# Patient Record
Sex: FEMALE | Race: BLACK OR AFRICAN AMERICAN | URBAN - NONMETROPOLITAN AREA
[De-identification: names, ages, dates, MRNs, and addresses within clinical notes are randomized per-mention and may not be internally consistent; named-entity substitution may affect disease eponyms.]

---

## 2017-08-04 PROBLEM — D57.3: Noted: 2017-08-04

## 2017-08-04 PROBLEM — H52.03: Noted: 2017-08-04

## 2018-10-23 NOTE — PROCEDURE NOTE: SURGICAL
<p>Prior to commencing surgery patient identification, surgical procedure, site, and side were confirmed by Dr. Soo Martines. Following topical proparacaine anesthesia, the patient was positioned at the YAG laser, a contact lens coupled to the cornea with methylcellulose and an axial posterior capsulotomy performed without complication using 3.4 Mj x 40. Excess methylcellulose was washed from the eye, one drop of Alphagan was instilled and the patient returned to the holding area having tolerated the procedure well and without complication. </p><p>MRN [id number]</p>

## 2019-05-09 NOTE — PROCEDURE NOTE: CLINICAL
PROCEDURE NOTE: Epilation #4 Left Upper Lid. Diagnosis: Blepharitis, Unspecified. Anesthesia: Topical. Prior to treatment, the risks/benefits/alternatives were discussed. The patient wished to proceed with procedure. Aberrant lashes removed from * lid(s) using microforcep. Patient tolerated procedure well. There were no complications. Post-op instructions given. Zoey Mota

## 2019-07-15 NOTE — PROCEDURE NOTE: SURGICAL
<p>Prior to commencing surgery patient identification, surgical procedure, site, and side were confirmed by Dr. Danilo Solorio. Following topical proparacaine anesthesia, the patient was positioned at the YAG laser, a contact lens coupled to the cornea with methylcellulose and an axial posterior capsulotomy performed without complication using 3.4 Mj x 31. Excess methylcellulose was washed from the eye, one drop of Alphagan was instilled and the patient returned to the holding area having tolerated the procedure well and without complication. </p><p>MRN [id number]</p> Universal Safety Interventions

## 2019-08-28 ENCOUNTER — IMPORTED ENCOUNTER (OUTPATIENT)
Dept: URBAN - NONMETROPOLITAN AREA CLINIC 1 | Facility: CLINIC | Age: 59
End: 2019-08-28

## 2019-08-28 PROCEDURE — 92015 DETERMINE REFRACTIVE STATE: CPT

## 2019-08-28 PROCEDURE — 92014 COMPRE OPH EXAM EST PT 1/>: CPT

## 2019-08-28 NOTE — PATIENT DISCUSSION
Hyperopia-Discussed diagnosis with patient. Updated spec Rx given. Presbyopia-Discussed diagnosis with patient. Updated spec Rx given.

## 2020-01-08 NOTE — PROCEDURE NOTE: CLINICAL
PROCEDURE NOTE: Epilation #2 Right Lower Lid. Prior to treatment, the risks/benefits/alternatives were discussed. The patient wished to proceed with procedure. Aberrant lashes removed from * lid(s) using microforcep. Patient tolerated procedure well. There were no complications. Post-op instructions given. Miguel A Alvarez

## 2021-02-23 NOTE — PATIENT DISCUSSION
[place]ler grid at home. MVI/AREDS discussed. Patient to call if any changes in vision or grid card. Pt given juice to drink for low blood sugar.       Nathalia Causey RN  02/23/21 [id number]

## 2021-06-18 ENCOUNTER — IMPORTED ENCOUNTER (OUTPATIENT)
Dept: URBAN - NONMETROPOLITAN AREA CLINIC 1 | Facility: CLINIC | Age: 61
End: 2021-06-18

## 2021-06-18 PROCEDURE — 92014 COMPRE OPH EXAM EST PT 1/>: CPT

## 2021-06-18 PROCEDURE — 92015 DETERMINE REFRACTIVE STATE: CPT

## 2021-06-18 NOTE — PATIENT DISCUSSION
Hyperopia-Discussed diagnosis with patient. Updated spec Rx given. Presbyopia-Discussed diagnosis with patient. Updated spec Rx given. Recommend lens that will provide comfort as well as protect safety and health of eyes.

## 2021-07-14 NOTE — PATIENT DISCUSSION
Add kayleigh 128 TIFF and drops to help with corneal edema. Disc with pt that poss corneal spec visit if vision worsening for endo count.

## 2021-08-30 NOTE — PATIENT DISCUSSION
Sent pt to [facility] stroke ER as this dizzyness has not subsided and she describes an episode of lack of motor function, TOV of OD and disorientation. No retinal emboli seen on exam today or hemes. Called My[facility] and rec eval with him asap. Pt daughter is with her and she will take her tonight. Call with any worsening.

## 2022-01-17 NOTE — PATIENT DISCUSSION
DISC LOTEMAX BUT PT REFUSES [facility]. POSS PLUG IN THE FUTURE.

## 2022-01-17 NOTE — PATIENT DISCUSSION
Discussed Fuchs' natural course and potential for corneal transplant (full thickness or partial thickness).

## 2022-04-09 ASSESSMENT — TONOMETRY
OS_IOP_MMHG: 15
OD_IOP_MMHG: 15
OS_IOP_MMHG: 15
OD_IOP_MMHG: 15

## 2022-04-09 ASSESSMENT — VISUAL ACUITY
OS_PH: 20/25-1
OD_CC: 20/50-2
OD_SC: J5
OD_SC: 20/20
OS_CC: J1
OD_CC: J1
OS_SC: J5
OS_CC: 20/40-2
OS_SC: 20/20
OD_PH: 20/25-1

## 2022-07-18 NOTE — PATIENT DISCUSSION
LONG DISC LIMITS VISION BUT OVERALL WITH REF CAPABLE OF 20/40 OU AND VERY STABLE. CALL WITH ANY CHANGES.

## 2022-07-18 NOTE — PATIENT DISCUSSION
Amsler grid at home. MVI/AREDS discussed. Patient to call if any changes in vision or grid card. Patient Demographics     Address  [address] Phone  [phone] [facility]) *Preferred*  [phone] [facility]) E-mail Address  [email or web address]. [employer]      Emergency Contact(s)     Name Relation Home Work Mobile    Schneider,Duane Spouse Next dose due: Tonight 5/1/18      Take 100 mg by mouth 2 (two) times daily. Mendoza Guillen MD         ferrous sulfate 325 (65 FE) MG Tbec  Next dose due:   Tomorrow morning 5/2/18 with breakfast       Take 325 mg by mouth daily with breakfast. - [address] [facility], [phone], [phone]  [address] [place] [address]    Hours:  24-hours Phone:  [phone]   furosemide 20 MG Tabs  insulin detemir 100 UNIT/ML Sopn     Please  your pres [id number] gabapentin (NEURONTIN) cap 600 mg 04/30/18 1655 Given      [id number] gabapentin (NEURONTIN) cap 600 mg 04/30/18 2120 Given      [id number] gabapentin (NEURONTIN) cap 600 mg 05/01/18 0857 Given            LEFT LOWER ABDOMEN     Order ID Medication Sodium 142 136 - 144 mmol/L — [facility]   Potassium 5.1 3.3 - 5.1 mmol/L — [facility]   Chloride 98 95 - 110 mmol/L — [facility]   CO2 39 22 - 32 mmol/L [facility]   BUN 16 8 - 20 mg/dL [employer] [facility]   Creatinine 0.87 0.50 - 1.50 mg/dL [employer] Blood Culture Result No Growth 5 Days    Blood Culture FREQ X 2 [[id number]] Collected:  04/18/18 3111    Order Status:  Completed Lab Status:  Final result Updated:  04/23/18 0700    Specimen:  Blood from Blood,peripheral      Blood Culture Result No Jayesh obesity-BMI 79, HTN, chronic pain/Chronic Opiate Dependency (but has been weaned off all narcotics), migraines, DM Type II, CKD Stage II, OPHELIA on CPAP, chronic resp failure on 4 L O2 daily, Depression/Anxiety, diastolic CHF, who presents with AMS, Fever, UT ARIPiprazole 2 MG Oral Tab Take 0.5 tablets (1 mg total) by mouth nightly. Disp: 30 tablet Rfl: 0   metoprolol Tartrate 25 MG Oral Tab Take 0.5 tablets (12.5 mg total) by mouth 2x Daily(Beta Blocker).  (Patient taking differently: Take 50 mg by mouth 2x [facility]:  Regular rate and rhythm, S1, S2 normal, no murmur, rub or gallop appreciated   Abdomen:   Soft, non-tender. Bowel sounds normal. No masses,  No organomegaly. Non distended   Extremities: Extremities with trace edema erythema on left lower ext.    Sk - has hx of OPHELIA and obesity hypoventilation, last admission was started on BIPAP and had opiates discontinued- has been compliant,   - pulm consulted, discussed with Dr. Aishwarya Alegria  - some congestion noted on CXR, lasix given in ER, further diuresis per pul hospitalist to continue to follow patient while in house    Patient and/or patient's family given opportunity to ask questions and note understanding and agreeing with therapeutic plan as outlined    Thank Bismark Chambers MD    [facility] Hospitalist  Answering S a neurologic evaluation for this. She does mention that these episodes were previously attributed to her mood disorder. She does mention as of late she has felt more tremulous, and has felt that her left leg feels heavy.     Seizure risk factors: no famil NEURO: see HPI    EXAM:     Vitals:[AS.1]  /51 (BP Location: Left arm)   Pulse 54   Temp 98 °F (36.7 °C) (Temporal)   Resp 19   Ht 66\"   Wt (!) 424 lb 13.2 oz   SpO2 93%   BMI 68.57 kg/m²[AS.2]    General: no apparent distress, pleasant and cooperat Discharge Summaries signed by Sofie Wagner MD at 5/1/2018 10:43 AM  Version 1 of 1    Author:  Sofie Wagner MD Service:  Hospitalist Author Type:  Physician    Filed:  5/1/2018 10:43 AM Date of Service:  5/1/2018 10:42 AM Status:  Signed discharge from the hospital.    Follow up Visits:  Follow-up with PCP in 1-2 weeks    Dr. Laine Engel in 2 weeks - trilogy follow up    Urology in 2-4 weeks for recurrent UTIs - Dr. Miner Ba or partner    Follow up Labs: BMP in 1 week (monitor renal function -was decreased to levemir 36 units daily with SSI, as patient hypoglycemic 4/18 and PO intake here likely less than home    #Depression/Anxiety  -stop wellbutrin,resumed zoloft 200 mg. No amitriptyline.  Continue Abilify added per [facility] on last admission    (!) 418 lb 6.9 oz (189.8 kg), SpO2 99 %.     gen- NAD   heent- moist mucus membranes, atraumatic  Lungs-  clear bilaterally, normal respiratory effort  cv-  rrr,  no murmurs, rubs or gallops  abd-  soft, nontender, nondistended, bowel sounds present  Skin- Refills:  0     Sertraline HCl 50 MG Tabs  Commonly known as:  ZOLOFT  What changed:  · medication strength  · how much to take      Take 3 tablets (150 mg total) by mouth nightly.    Refills:  0        CONTINUE taking these medications      Instructions Pr · Insulin Aspart Pen 100 UNIT/ML Sopn              Total time preparing discharge greater than 30 minutes    Aubrey Krause  4/30/2018  1:00 PM[AW.1]    Electronically signed by Gisella Estrada MD on 5/1/2018 10:43 AM   Attribution Key    AW. 1 - PLAN  PT Treatment Plan: Bed mobility; Body mechanics; Patient education;Transfer training;Balance training    SUBJECTIVE  \"I don't need very much help\"    OBJECTIVE  Precautions: None    WEIGHT BEARING RESTRICTION  Weight Bearing Restriction: None Current Status Not tested    Goal #3     Goal #3   Current Status     Goal #4     Goal #4   Current Status     Goal #5 Patient to demonstrate independence with home activity/exercise instructions provided to patient in preparation for discharge.    Goal #5 between. Patient then educated on additional BUE strengthening and core control activities she can do independently; patient verbalized understanding.  Patient repositioned in elevated supine w/ patient able to adjust positioning while in Trendelenburg posi FUNCTIONAL ADL ASSESSMENT  Grooming: set-up A  Feeding: ind  Bathing: NT  Toileting: NT  Upper Extremity Dressing: min A  Lower Extremity Dressing: total A    Education Provided: plan of care, safety, HEP  Patient End of Session: In bed;Needs met;Call [facility]

## 2022-11-14 ENCOUNTER — COMPREHENSIVE EXAM (OUTPATIENT)
Dept: RURAL CLINIC 1 | Facility: CLINIC | Age: 62
End: 2022-11-14

## 2022-11-14 DIAGNOSIS — H52.03: ICD-10-CM

## 2022-11-14 DIAGNOSIS — D57.3: ICD-10-CM

## 2022-11-14 PROCEDURE — 92015 DETERMINE REFRACTIVE STATE: CPT

## 2022-11-14 PROCEDURE — 92014 COMPRE OPH EXAM EST PT 1/>: CPT

## 2022-11-14 ASSESSMENT — VISUAL ACUITY
OU_SC: 20/40-3
OS_PH: 20/25
OS_SC: 20/40-3
OD_SC: 20/40
OD_SC: 20/40-3
OS_SC: 20/40
OD_PH: 20/25
OU_SC: 20/40

## 2022-11-14 ASSESSMENT — TONOMETRY
OS_IOP_MMHG: 15
OD_IOP_MMHG: 15

## 2022-11-23 NOTE — PATIENT DISCUSSION
GI Discharge Instructions Endoscopy      11/23/2022    During your exam, the physician:    Lab results will be called/mailed to you within 7-10 business days.  If you have not heard from the doctor within 10 days, call the office for results:  Dr. CODY Samuel, [phone]  Other EGD/Endoflip: showed esophagus functioning normally/bottom of esophagus opening/closing properly. A stricture in the esophagus was dilated with a balloon. Biopsies were obtained from the stomach and esophagus. Colon: showed diverticulosis (pockets in the wall of the colon) throughout the colon, and external hemorrhoids    DIET INSTRUCTIONS:  Resume your regular diet and Other: Chew food very very well    PRESCRIPTIONS/MEDICATIONS  No new prescriptions given today    A RESPONSIBLE ADULT MUST ACCOMPANY YOU AND DRIVE YOU HOME    You had the following procedure(s) today:   Colonoscopy and Upper Endoscopy      There is no need to hold any aspirin or anti-inflammatory medications.     Following sedation, your judgement, perception and coordination are impaired for a minimum of 24 hours.      Therefore:  Do not drive.  Do not return to work today.  Do not operate appliances or machinery that require quick reaction time  Do not sign legal documents or be involved in work decisions  Do not smoke or drink alcoholic beverages for 24 hours  Plan to spend a few hours resting before resuming your normal routine    Please call your physician in the event that you experience any of the following or proceed to the nearest hospital in the event of an emergency:     UPPER ENDOSCOPY:    Difficulty swallowing or breathing  Neck swelling  Excessive pain, you may have mild chest pain or discomfort which should pass within 1-2 hours with the passage of air.  Nausea or Vomiting  Abdominal distention  Fever  A mild sore throat may follow this procedure.  Warm salt-water gargle or lozenges may relieve your discomfort.  ..  COLONOSCOPY  Fever  Severe abdominal  Recommended artificial tears to use: 1 drop 4x a day in both eyes. distention or pain. Some mild distention and/or cramping are normal after these procedures but should pass within an hour or two with the passage of air.  Rectal bleeding more than blood streaking on the toilet  tissue  Nausea or Vomiting    If you have any questions or concerns, contact Dr. CODY Samuel, [phone]    ADDITIONAL INSTRUCTIONS: none

## 2023-11-15 NOTE — PATIENT DISCUSSION
Patient name and date of birth verified. Patient given an after visit summary. Patient advised to contact office in January 2024 for scheduling of 6 months follow up care. Patient verbalized understanding of all information at time of visit.  Denisha Mullins LPN Patient to return to office for refraction. Ocular health does not support visual complaints.

## 2024-02-20 ENCOUNTER — COMPREHENSIVE EXAM (OUTPATIENT)
Dept: RURAL CLINIC 1 | Facility: CLINIC | Age: 64
End: 2024-02-20

## 2024-02-20 DIAGNOSIS — D57.3: ICD-10-CM

## 2024-02-20 DIAGNOSIS — H52.4: ICD-10-CM

## 2024-02-20 DIAGNOSIS — H52.03: ICD-10-CM

## 2024-02-20 PROCEDURE — 92015 DETERMINE REFRACTIVE STATE: CPT

## 2024-02-20 PROCEDURE — 92014 COMPRE OPH EXAM EST PT 1/>: CPT

## 2024-02-20 ASSESSMENT — VISUAL ACUITY
OD_SC: 20/30
OS_SC: 20/30
OS_SC: 20/50
OD_SC: 20/50

## 2024-02-20 ASSESSMENT — TONOMETRY
OS_IOP_MMHG: 14
OD_IOP_MMHG: 14

## 2024-02-21 ENCOUNTER — EMERGENCY VISIT (OUTPATIENT)
Dept: RURAL CLINIC 1 | Facility: CLINIC | Age: 64
End: 2024-02-21

## 2024-02-21 DIAGNOSIS — H11.31: ICD-10-CM

## 2024-02-21 PROCEDURE — 92012 INTRM OPH EXAM EST PATIENT: CPT

## 2024-02-21 ASSESSMENT — VISUAL ACUITY
OD_SC: 20/40
OS_SC: 20/60

## 2025-02-24 ENCOUNTER — COMPREHENSIVE EXAM (OUTPATIENT)
Age: 65
End: 2025-02-24

## 2025-02-24 DIAGNOSIS — D57.3: ICD-10-CM

## 2025-02-24 DIAGNOSIS — H52.4: ICD-10-CM

## 2025-02-24 DIAGNOSIS — H52.03: ICD-10-CM

## 2025-02-24 PROCEDURE — 92014 COMPRE OPH EXAM EST PT 1/>: CPT

## 2025-02-24 PROCEDURE — 92015 DETERMINE REFRACTIVE STATE: CPT
